# Patient Record
(demographics unavailable — no encounter records)

---

## 2024-11-11 NOTE — PROCEDURE
[FreeTextEntry3] : Date of Service: 11/07/2024   Account: [id number]  Patient: LISANDRA MIRANDA   YOB: 1970  Age: 54 year  Surgeon:      Matthieu Cordoba D.O.  Assistant:    None  Pre-Operative Diagnosis:         Cervical Radiculopathy (M54.12)  Post Operative Diagnosis:       Cervical Radiculopathy (M54.12)  Procedure:             Cervical (C7-T1) interlaminar epidural steroid injection under fluoroscopic guidance  Anesthesia:  MAC  This procedure was carried out using fluoroscopic guidance.  The risks and benefits of the procedure were discussed extensively with the patient.  The consent of the patient was obtained and the following procedure was performed. The patient was placed in the prone position on the fluoroscopy table and the area was prepped and draped in a sterile fashion.  A timeout was performed with all essential staff present and the site and side were verified.  The patient was placed in the prone position and optimized to patient comfort.  The cervical area was prepped and draped in a sterile fashion.  The fluoroscope visualized the C7-T1 interspace using slight cephalad-caudad angulation and this area was marked.  Using sterile technique the superficial skin was anesthetized with 1% Lidocaine.  A 20 gauge 3.5 inch Tuohy needle was advanced under fluoroscopy until ligament was engaged.  Using a contralateral oblique view, a "loss of resistance" to air technique was utilized in order to gain access to the epidural space.  After negative aspiration for heme and CSF, 1 cc of Omnipaque contrast was administered and the appropriate cervical epidurogram was obtained in the MARGIE and A/P view as well as digital subtraction angiography.  A total injectate of 3 cc of preservative free normal saline and 10mg decadron was then injected into the epidural space while maintaining meaningful verbal contact with the patient.    The needle was subsequently removed.  Vital signs remained normal.  Pulse oximeter was used throughout the procedure and the patient's pulse and oxygen saturation remained within normal limits.  The patient tolerated the procedure well.  There were no complications.  The patient was instructed to apply ice over the injection sites for twenty minutes every two hours for the next 24 to 48 hours.  Disposition:       1. The patient was advised to F/U in 1-2 weeks to assess the response to the injection.      2. The patient was also instructed to contact me immediately if there were any concerns related to the procedure performed.

## 2024-11-19 NOTE — DISCUSSION/SUMMARY
[de-identified] : A discussion regarding available pain management treatment options occurred with the patient. These included interventional, rehabilitative, pharmacological, and alternative modalities. We will proceed with the following:   Interventional treatment options: - Patient underwent two successful Bilateral L3, L4, L5 medial branch blocks with over 75% sustained relief.  1. Proceed with a Right and Left L3, L4, L5 medial branch radiofrequency ablation with sedation.  Patient presents with axial lumbar pain that has not responded to 3 months of conservative therapy including physical therapy or NSAID therapy.  The pain is interfering with activities of daily living and functionality.  The pain is exacerbated by facet loading.  Positive Kemps maneuver which is defined by pain reproduction with extension and rotation of the lumbar spine to the affected side.  The patient has had greater than 80% to two lumbar medial branch blocks. There is no unexplained neurologic deficit.  There is no history of systemic infection, unstable medical condition, bleeding tendency, or local infection.  This intervention is being performed to treat the patient's pain coming from the lumbar facet joints.   The patient has severe anxiety of procedures that necessitates monitored anesthesia care (MAC). The procedure performed will be close to major nerves, arteries, and spinal cord and/or joint structures. Due to the proximity of these structures, we need the patient to be still during the procedure.  With the help of MAC, this will be safely achieved and decrease the risk of any complications.   - Follow up 2 weeks post injection.   I Kendy Valdez attest that this documentation has been prepared under the direction and in the presence of provider Dr. Matthieu Cordoba.  The documentation recorded by the scribe in my presence, accurately reflects the service I personally performed, and the decisions made by me with my edits as appropriate.   Matthieu Cordoba, DO

## 2024-11-19 NOTE — PHYSICAL EXAM
[de-identified] : L spine   No rashes, erythema, edema noted TTP b/l lumbar paraspinal muscles Positive facet loading bilaterally Positive KEMPS test bilaterally LLE: 5/5 strength RLE: 5/5 strength Sensation intact b/l LE    C spine  No rashes, erythema, edema noted TTP b/l cervical paraspinal and trapezius muscles Positive spurlings bilaterally RUE: 5/5 strength LUE: 5/5 strength

## 2024-11-19 NOTE — DISCUSSION/SUMMARY
[de-identified] : A discussion regarding available pain management treatment options occurred with the patient. These included interventional, rehabilitative, pharmacological, and alternative modalities. We will proceed with the following:   Interventional treatment options: - Patient underwent two successful Bilateral L3, L4, L5 medial branch blocks with over 75% sustained relief.  1. Proceed with a Right and Left L3, L4, L5 medial branch radiofrequency ablation with sedation.  Patient presents with axial lumbar pain that has not responded to 3 months of conservative therapy including physical therapy or NSAID therapy.  The pain is interfering with activities of daily living and functionality.  The pain is exacerbated by facet loading.  Positive Kemps maneuver which is defined by pain reproduction with extension and rotation of the lumbar spine to the affected side.  The patient has had greater than 80% to two lumbar medial branch blocks. There is no unexplained neurologic deficit.  There is no history of systemic infection, unstable medical condition, bleeding tendency, or local infection.  This intervention is being performed to treat the patient's pain coming from the lumbar facet joints.   The patient has severe anxiety of procedures that necessitates monitored anesthesia care (MAC). The procedure performed will be close to major nerves, arteries, and spinal cord and/or joint structures. Due to the proximity of these structures, we need the patient to be still during the procedure.  With the help of MAC, this will be safely achieved and decrease the risk of any complications.   - Follow up 2 weeks post injection.   I Kendy Valdez attest that this documentation has been prepared under the direction and in the presence of provider Dr. Matthieu Cordoba.  The documentation recorded by the scribe in my presence, accurately reflects the service I personally performed, and the decisions made by me with my edits as appropriate.   Matthieu Cordoba, DO

## 2024-11-19 NOTE — HISTORY OF PRESENT ILLNESS
[FreeTextEntry1] : ORIGINAL PRESENTATION: We continue to follow up with the patient for his cervical pain associated with radiculopathy symptoms into the left upper extremity status post a work-related injury sustained in 2006. He has a mild to moderate disability. Ongoing chronic low back pain also noted with referred features into the bilateral lower extremities along the anterior aspect.  TODAY: He is called for follow up via phone. He is status post a bilateral SI joint injection on 10/26/23 which is providing about 60% relief of his SI joint pain. Overall, functionality has improved. He is able to sit, stand, and ambulate for longer periods of time. Preforming his ADLs are more comfortable. He does continue to have some residual buttock pain bilaterally; He wishes to proceed with a second bilateral SI joint injection to build on his relief. Relief from RFAs have been short-lived.   Patient has been under the care of Dr. Benitez for his cervical spine.  TODAY, 3-: Revisit encounter.   Since his last visit, he underwent a left hip injection on 3-7-2024. Day of the injection he afforded 80-90% pain relief. A couple of days later his pain started to return. His pain has not as severe as prior to the injection. He is also noting pain across the lower lumbar spine around the waistband bilaterally. He has pain with extension-based movements. He does note pain which is being referred into the groin bilaterally. He has pain with sitting for prolonged periods of time. When he sits, he cannot move his left without physically lifting it himself. He has pain when initially transitioning from a seated to standing position. He has pain when starting to walk however it then eases up. Pain limits his ADLs.  He is also complaining of left knee pain. He notes pain around the knee and refers into the anterior thigh and shin. Pain is made worse with sitting for prolonged periods of time or with any weight bearing.   TODAY, 5-: Revisit encounter.   Since his last visit, he underwent a bilateral L3, L4, L5 medial branch facet block on 5-2-2024. He afforded about 85% sustained relief for about 1-2 days following this procedure. His pain then slowly started to return. Today, he continues with about 50% sustained relief. He has stiffness in the back which is made worse first thing in the morning when getting up out of bed. He has pain with transitional movements or when rotating the back. Pain is present daily. He has been managing his pain with Meloxicam 15 mg daily as needed. He takes this about 3-4 days per week.   Over the last couple of weeks, he has been complaining of mid thoracic pain. He has been noticing sharp, stabbing pains in the area along with pain which wraps around into the thoracic paraspinal area. He does note shooting pains which can stop him in his tracks at time. Pain is made worse with flexion and extension-based movements. Pain has been present daily.   TODAY, 7-9-2024: Revisit encounter.   Since his last visit, he underwent a Bilateral L3, L4, L5 medial branch block on 6-. He affords about 75% sustained relief from this procedure. He continues with some ongoing discomfort however the lower back pain is almost fully under control. He does continue with some stiffness after performing any aggressive physical activity. At the present, his pain is tolerable. He does manage his pain with Meloxicam 15 mg daily prn.   With regards to his thoracic pain, he continues today with ongoing pain. He has been noticing sharp, stabbing pains in the area along with pain which wraps around into the thoracic paraspinal area. He does note shooting pains which can stop him in his tracks at time. Pain is made worse with flexion and extension-based movements. Pain has been present daily.   11-: Revisit encounter.   Since his last office visit, he underwent a C7-T1 cervical epidural steroid injection on 11-7-2024. He afforded about 100% pain relief from this procedure.   He is presenting today with a flare up of lower back pain. His pain continues to be axial in nature. He denies any radicular component. Pain is associated with stiffness and spams. Pain is made worse with standing or walking for prolonged periods of time. He has to hunch over to relieve the pain. The pain is present daily and limits his ADLS. He denies any radicular pain.

## 2024-11-19 NOTE — PHYSICAL EXAM
[de-identified] : L spine   No rashes, erythema, edema noted TTP b/l lumbar paraspinal muscles Positive facet loading bilaterally Positive KEMPS test bilaterally LLE: 5/5 strength RLE: 5/5 strength Sensation intact b/l LE    C spine  No rashes, erythema, edema noted TTP b/l cervical paraspinal and trapezius muscles Positive spurlings bilaterally RUE: 5/5 strength LUE: 5/5 strength

## 2024-11-19 NOTE — DATA REVIEWED
[FreeTextEntry1] : MRI of the thoracic spine taken on 6- showed moderate thoracic dextroscoliosis. Multilevel degenerative disc change/disc protrusions. Multilevel degenerative facet disease. Left posterior T6-7 disc protrusion with questions of minimal left anterior cord invagination. No significant thoracic canal and foraminal stenosis. Suspect bilateral posterior thyroid nodules.

## 2024-12-09 NOTE — IMAGING
[de-identified] : Physical exam of the left shoulder: No effusion, no erythema or ecchymosis.  Active range of motion forward flexion 130 degrees, active range of motion abduction 100 degrees, active motion from rotation L4.  He has some tenderness to palpation to the anterior aspect of the shoulder.  Intact to light touch.

## 2024-12-09 NOTE — PROCEDURE
[Large Joint Injection] : Large joint injection [Left] : of the left [Glenohumeral Joint] : glenohumeral joint [Pain] : pain [Alcohol] : alcohol [___ cc    1%] : Lidocaine ~Vcc of 1%  [___ cc    4mg] : Dexamethasone (Decadron) ~Vcc of 4 mg  [Risks, benefits, alternatives discussed / Verbal consent obtained] : the risks benefits, and alternatives have been discussed, and verbal consent was obtained [All ultrasound images have been permanently captured and stored accordingly in our picture archiving and communication system] : All ultrasound images have been permanently captured and stored accordingly in our picture archiving and communication system

## 2024-12-09 NOTE — HISTORY OF PRESENT ILLNESS
[de-identified] : The patient is a 54-year-old male here for a subsequent reevaluation of his left shoulder.  He does have glenohumeral joint arthritis.  The patient has pain in the left shoulder on a daily basis.  He had good relief from the injection administered at his previous office visit here.  He is doing home therapy exercises and takes meloxicam as needed.  He works as a  and is working full duty.

## 2024-12-09 NOTE — DISCUSSION/SUMMARY
[de-identified] : Today I recommend a cortisone injection for the left shoulder the patient agreed.  Left shoulder was injected with 2 cc lidocaine, 1 cc dexamethasone.  Procedure note generated.  He will continue home therapy exercises for the shoulder.  He is working full duty, mild degree of disability.  I will see him back in 2 months for further evaluation.

## 2024-12-26 NOTE — PROCEDURE
[FreeTextEntry3] : Date of Service: 12/23/2024   Account: [id number]  Patient: LISANDRA MIRANDA   YOB: 1970  Age: 54 year  Surgeon:                  Matthieu Cordoba DO  Assistant:                None  Pre-Operative Diagnosis:   Spondylosis of Lumbar Region without Myelopathy or Radiculopathy (M47.816)      Post Operative Diagnosis: Spondylosis of Lumbar Region without Myelopathy or Radiculopathy (M47.816)      Procedure:             Left L4-5, L5-S1 facet joint (L3, L4, L5 medial branch nerve) radiofrequency ablation with fluoroscopic guidance.  Anesthesia:            MAC  This procedure was carried out using fluoroscopic guidance.  The risks and benefits of the procedure were discussed extensively with the patient.  The consent of the patient was obtained and the following procedure was performed. The patient was placed in the prone position on the fluoroscopy table and the area was prepped and draped in a sterile fashion.  A timeout was performed with all essential staff present and the site and side were verified.  Under A/P visualization, the right sacral ala was identified and marked.  Using a 25 gauge needle the skin and subcutaneous structures at this point were localized with approximately 3 mL of 1% Lidocaine.  After this, an 18-gauge 10cm radiofrequency needle with a 10mm curved active tip was inserted under fluoroscopic visualization until the needle made contact with the sacral ala.   The fluoroscope was then redirected under A/P view to visualize the right L3-L4 and L4-L5 facet joint levels. The camera was obliqued to approximately 30 degrees to reveal good Kalen dog anatomical view. The junction of the superior articulate process and transverse process at the targeted levels were then identified and marked. Skin and subcutaneous structures were then anesthetized with approximately 3 mL of 1% Lidocaine at each of these levels.  After this, an 18-gauge 10cm radiofrequency needle with a 10mm curved active tip then advanced until the junction at the SAP and transverse process was met at each level.  Both ipsilateral oblique and lateral fluoroscopic views were obtained in order to advance the needle to the intended targets which was at the junction of the SAP and transverse process.   At all the treated levels, sensory stimulation was performed at 50 Hz not exceeding 1.0 volt and motor stimulation testing at 2 Hz not exceeding 3.0 volts.  There was no abnormal sensory or motor stimulation observed or reported by the patient most notably in the lower extremities.  Each treated level was then anesthetized with approximately 1 ml of 0.25% Marcaine. After which each area was then ablated at 80 degrees centigrade for 90 seconds each. Patient felt no pain reproduction during the ablation procedure.  The needles were subsequently removed.  Vital signs remained normal.  Pulse oximeter was used throughout the procedure and the patient's pulse and oxygen saturation remained within normal limits.  The patient tolerated the procedure well.  There were no complications.  The patient was instructed to apply ice over the injection sites for twenty minutes every two hours for the next 24 to 48 hours.  Disposition:       1. The patient was advised to F/U in 4 weeks to assess the response to the procedure.       2. The patient was also instructed to contact me immediately if there were any concerns related to the procedure performed.

## 2025-01-02 NOTE — PROCEDURE
[FreeTextEntry3] : Date of Service: 12/30/2025   Account: [id number]  Patient: LISANDRA MIRANDA   YOB: 1970  Age: 54 year  Surgeon:                  Matthieu Cordoba DO  Assistant:                None  Pre-Operative Diagnosis:   Spondylosis of Lumbar Region without Myelopathy or Radiculopathy (M47.816)      Post Operative Diagnosis: Spondylosis of Lumbar Region without Myelopathy or Radiculopathy (M47.816)      Procedure:             Right L4-5, L5-S1 facet joint (L3, L4, L5 medial branch nerve) radiofrequency ablation with fluoroscopic guidance.  Anesthesia:            MAC   This procedure was carried out using fluoroscopic guidance.  The risks and benefits of the procedure were discussed extensively with the patient.  The consent of the patient was obtained and the following procedure was performed. The patient was placed in the prone position on the fluoroscopy table and the area was prepped and draped in a sterile fashion.  A timeout was performed with all essential staff present and the site and side were verified.  Under A/P visualization, the right sacral ala was identified and marked.  Using a 25 gauge needle the skin and subcutaneous structures at this point were localized with approximately 3 mL of 1% Lidocaine.  After this, an 18-gauge 10cm radiofrequency needle with a 10mm curved active tip was inserted under fluoroscopic visualization until the needle made contact with the sacral ala.   The fluoroscope was then redirected under A/P view to visualize the right L3-L4 and L4-L5 facet joint levels. The camera was obliqued to approximately 30 degrees to reveal good Kalen dog anatomical view. The junction of the superior articulate process and transverse process at the targeted levels were then identified and marked. Skin and subcutaneous structures were then anesthetized with approximately 3 mL of 1% Lidocaine at each of these levels.  After this, an 18-gauge 10cm radiofrequency needle with a 10mm curved active tip then advanced until the junction at the SAP and transverse process was met at each level.  Both ipsilateral oblique and lateral fluoroscopic views were obtained in order to advance the needle to the intended targets which was at the junction of the SAP and transverse process.   At all the treated levels, sensory stimulation was performed at 50 Hz not exceeding 1.0 volt and motor stimulation testing at 2 Hz not exceeding 3.0 volts.  There was no abnormal sensory or motor stimulation observed or reported by the patient most notably in the lower extremities.  Each treated level was then anesthetized with approximately 1 ml of 0.25% Marcaine. After which each area was then ablated at 80 degrees centigrade for 90 seconds each. Patient felt no pain reproduction during the ablation procedure.  The needles were subsequently removed.  Vital signs remained normal.  Pulse oximeter was used throughout the procedure and the patient's pulse and oxygen saturation remained within normal limits.  The patient tolerated the procedure well.  There were no complications.  The patient was instructed to apply ice over the injection sites for twenty minutes every two hours for the next 24 to 48 hours.  Disposition:       1. The patient was advised to F/U in 4 weeks to assess the response to the procedure.       2. The patient was also instructed to contact me immediately if there were any concerns related to the procedure performed.

## 2025-01-29 NOTE — DISCUSSION/SUMMARY
[de-identified] : A discussion regarding available pain management treatment options occurred with the patient. These included interventional, rehabilitative, pharmacological, and alternative modalities. We will proceed with the following:   Interventional treatment options: - None indicated at this time.   Imagin. Ordered X-rays of the bilateral hips/pelvis due to pain and decrease in range of motion in that area to delineate a pain generator.   Complementary treatment options: - lifestyle modifications discussed - avoid bending and bend with knees - avoid heavy lifting   - Follow up in 2-3 months.   Disability status: Mild partial disability. Patient is working full time.   The patient had cervical ESIs and has done well. Pain is controlled.  He has had bilateral L3-5 mb RFA and is about 80% improved.   Total clinician time spent today on the patient is 30 minutes including preparing to see the patient, obtaining and/or reviewing and confirming history, performing medically necessary and appropriate examination, counseling and educating the patient and/or family, documenting clinical information in the EHR and communicating and/or referring to other healthcare professionals.   I Kendy Valdez attest that this documentation has been prepared under the direction and in the presence of provider Dr. Matthieu Cordoba.  The documentation recorded by the scribe in my presence, accurately reflects the service I personally performed, and the decisions made by me with my edits as appropriate.   Matthieu Cordoba, DO

## 2025-01-29 NOTE — PHYSICAL EXAM
[de-identified] : L spine   No rashes, erythema, edema noted TTP b/l lumbar paraspinal muscles Facet loading negative LLE: 5/5 strength RLE: 5/5 strength Sensation intact b/l LE    C spine  No rashes, erythema, edema noted TTP b/l cervical paraspinal and trapezius muscles spurlings negative bilaterally  RUE: 5/5 strength LUE: 5/5 strength

## 2025-01-29 NOTE — HISTORY OF PRESENT ILLNESS
[FreeTextEntry1] : ORIGINAL PRESENTATION: We continue to follow up with the patient for his cervical pain associated with radiculopathy symptoms into the left upper extremity status post a work-related injury sustained in 2006. He has a mild to moderate disability. Ongoing chronic low back pain also noted with referred features into the bilateral lower extremities along the anterior aspect.  TODAY: He is called for follow up via phone. He is status post a bilateral SI joint injection on 10/26/23 which is providing about 60% relief of his SI joint pain. Overall, functionality has improved. He is able to sit, stand, and ambulate for longer periods of time. Preforming his ADLs are more comfortable. He does continue to have some residual buttock pain bilaterally; He wishes to proceed with a second bilateral SI joint injection to build on his relief. Relief from RFAs have been short-lived.   Patient has been under the care of Dr. Benitez for his cervical spine.  TODAY, 3-: Revisit encounter.   Since his last visit, he underwent a left hip injection on 3-7-2024. Day of the injection he afforded 80-90% pain relief. A couple of days later his pain started to return. His pain has not as severe as prior to the injection. He is also noting pain across the lower lumbar spine around the waistband bilaterally. He has pain with extension-based movements. He does note pain which is being referred into the groin bilaterally. He has pain with sitting for prolonged periods of time. When he sits, he cannot move his left without physically lifting it himself. He has pain when initially transitioning from a seated to standing position. He has pain when starting to walk however it then eases up. Pain limits his ADLs.  He is also complaining of left knee pain. He notes pain around the knee and refers into the anterior thigh and shin. Pain is made worse with sitting for prolonged periods of time or with any weight bearing.   TODAY, 5-: Revisit encounter.   Since his last visit, he underwent a bilateral L3, L4, L5 medial branch facet block on 5-2-2024. He afforded about 85% sustained relief for about 1-2 days following this procedure. His pain then slowly started to return. Today, he continues with about 50% sustained relief. He has stiffness in the back which is made worse first thing in the morning when getting up out of bed. He has pain with transitional movements or when rotating the back. Pain is present daily. He has been managing his pain with Meloxicam 15 mg daily as needed. He takes this about 3-4 days per week.   Over the last couple of weeks, he has been complaining of mid thoracic pain. He has been noticing sharp, stabbing pains in the area along with pain which wraps around into the thoracic paraspinal area. He does note shooting pains which can stop him in his tracks at time. Pain is made worse with flexion and extension-based movements. Pain has been present daily.   TODAY, 7-9-2024: Revisit encounter.   Since his last visit, he underwent a Bilateral L3, L4, L5 medial branch block on 6-. He affords about 75% sustained relief from this procedure. He continues with some ongoing discomfort however the lower back pain is almost fully under control. He does continue with some stiffness after performing any aggressive physical activity. At the present, his pain is tolerable. He does manage his pain with Meloxicam 15 mg daily prn.   With regards to his thoracic pain, he continues today with ongoing pain. He has been noticing sharp, stabbing pains in the area along with pain which wraps around into the thoracic paraspinal area. He does note shooting pains which can stop him in his tracks at time. Pain is made worse with flexion and extension-based movements. Pain has been present daily.   11-: Revisit encounter.   Since his last office visit, he underwent a C7-T1 cervical epidural steroid injection on 11-7-2024. He afforded about 100% pain relief from this procedure.   He is presenting today with a flare up of lower back pain. His pain continues to be axial in nature. He denies any radicular component. Pain is associated with stiffness and spams. Pain is made worse with standing or walking for prolonged periods of time. He has to hunch over to relieve the pain. The pain is present daily and limits his ADLS. He denies any radicular pain.   1-: Revisit encounter.   With regards to his neck pain, he continues to heal well from surgical correction. He has little to no pain.   With regards to his lower back pain, he is status post Right L3, L4, L5 medial branch radiofrequency ablation on 12- and a Left L3, L4, L5 medial branch radiofrequency ablation on 12-. He affords about 70-80% sustained relief from this procedure. His pain is now tolerable and only made worse with prolonged physical activity. He is pleased with the results.

## 2025-01-29 NOTE — DISCUSSION/SUMMARY
[de-identified] : A discussion regarding available pain management treatment options occurred with the patient. These included interventional, rehabilitative, pharmacological, and alternative modalities. We will proceed with the following:   Interventional treatment options: - None indicated at this time.   Imagin. Ordered X-rays of the bilateral hips/pelvis due to pain and decrease in range of motion in that area to delineate a pain generator.   Complementary treatment options: - lifestyle modifications discussed - avoid bending and bend with knees - avoid heavy lifting   - Follow up in 2-3 months.   Disability status: Mild partial disability. Patient is working full time.   The patient had cervical ESIs and has done well. Pain is controlled.  He has had bilateral L3-5 mb RFA and is about 80% improved.   Total clinician time spent today on the patient is 30 minutes including preparing to see the patient, obtaining and/or reviewing and confirming history, performing medically necessary and appropriate examination, counseling and educating the patient and/or family, documenting clinical information in the EHR and communicating and/or referring to other healthcare professionals.   I Kendy Valdez attest that this documentation has been prepared under the direction and in the presence of provider Dr. Matthieu Cordoba.  The documentation recorded by the scribe in my presence, accurately reflects the service I personally performed, and the decisions made by me with my edits as appropriate.   Matthieu Cordoba, DO

## 2025-01-29 NOTE — PHYSICAL EXAM
[de-identified] : L spine   No rashes, erythema, edema noted TTP b/l lumbar paraspinal muscles Facet loading negative LLE: 5/5 strength RLE: 5/5 strength Sensation intact b/l LE    C spine  No rashes, erythema, edema noted TTP b/l cervical paraspinal and trapezius muscles spurlings negative bilaterally  RUE: 5/5 strength LUE: 5/5 strength

## 2025-02-03 NOTE — HISTORY OF PRESENT ILLNESS
[de-identified] : The patient is a 54-year-old male here for a subsequent reevaluation of his left shoulder.  He does have glenohumeral joint arthritis.  He states his shoulder feels about the same.  He had a cortisone injection at his last office visit which provided him with some relief.  He is currently working full duty.

## 2025-02-03 NOTE — IMAGING
[de-identified] : Physical exam of the left shoulder: No effusion, no erythema or ecchymosis.  Active range of motion forward flexion 150 degrees, passive range of motion forward flexion 170 degrees.  Active range of motion abduction 100 degrees, active motion from rotation L4.  He has some tenderness to palpation  over the glenohumeral joint.  Some weakness with rotator cuff resistance testing.

## 2025-02-03 NOTE — DISCUSSION/SUMMARY
[de-identified] : Today I recommend a cortisone injection for the left shoulder the patient agreed.  Left shoulder was injected with 2 cc lidocaine, 1 cc dexamethasone.  Procedure note generated.  He will continue with home therapy exercises for the left shoulder.  I will see him in 2 months for further evaluation.  He is working full duty, mild degree of disability.

## 2025-02-03 NOTE — PROCEDURE
[Large Joint Injection] : Large joint injection [Left] : of the left [Glenohumeral Joint] : glenohumeral joint [Pain] : pain [Alcohol] : alcohol [___ cc    1%] : Lidocaine ~Vcc of 1%  [___ cc    4mg] : Dexamethasone (Decadron) ~Vcc of 4 mg  [All ultrasound images have been permanently captured and stored accordingly in our picture archiving and communication system] : All ultrasound images have been permanently captured and stored accordingly in our picture archiving and communication system

## 2025-04-07 NOTE — PROCEDURE
[Large Joint Injection] : Large joint injection [Left] : of the left [Glenohumeral Joint] : glenohumeral joint [Pain] : pain [Alcohol] : alcohol [___ cc    1%] : Lidocaine ~Vcc of 1%  [___ cc    4mg] : Dexamethasone (Decadron) ~Vcc of 4 mg  [Risks, benefits, alternatives discussed / Verbal consent obtained] : the risks benefits, and alternatives have been discussed, and verbal consent was obtained

## 2025-04-07 NOTE — HISTORY OF PRESENT ILLNESS
[de-identified] : The patient is a 55-year-old male here for a subsequent reevaluation of his left shoulder.  He does have glenohumeral joint arthritis.  He again states his shoulder feels about the same.  He had a cortisone injection at his last office visit which provided he states helped a bit.  He is working full duty as a .

## 2025-04-07 NOTE — IMAGING
[de-identified] : Physical exam of the left shoulder: No effusion, no erythema or ecchymosis.  Active range of motion forward flexion 165 degrees, passive range of motion forward flexion 180 degrees.  Active range of motion abduction 100 degrees, active motion from rotation L4.  He has some tenderness to palpation over the glenohumeral joint.  Some weakness with rotator cuff resistance testing.

## 2025-04-07 NOTE — DISCUSSION/SUMMARY
[de-identified] : Today I recommend a cortisone injection for the left shoulder the patient agreed.  Left shoulder was injected with 2 cc lidocaine, 1 cc dexamethasone.  Procedure note generated.  He will continue home therapy exercises.  He was advised to take meloxicam as needed not daily.  New Rx sent to the pharmacy.  I will see him back in 2 months for further evaluation, he is working full duty, mild degree of disability.

## 2025-04-24 NOTE — PHYSICAL EXAM
[de-identified] : L spine   No rashes, erythema, edema noted TTP b/l lumbar paraspinal muscles Facet loading negative LLE: 5/5 strength RLE: 5/5 strength Sensation intact b/l LE    C spine  No rashes, erythema, edema noted TTP b/l cervical paraspinal and trapezius muscles spurlings negative bilaterally  RUE: 5/5 strength LUE: 5/5 strength   L hip  No rashes, erythema, edema  TTP at anterior hip, gluteus, GTB Stability: no gross instability  ROM: Painful ROM  JOE + Gait: limping with weight bearing

## 2025-04-24 NOTE — DISCUSSION/SUMMARY
[de-identified] : A discussion regarding available pain management treatment options occurred with the patient. These included interventional, rehabilitative, pharmacological, and alternative modalities. We will proceed with the following:   Interventional treatment options: 1. Proceed with a Left hip injection under fluoroscopic guidancde.  The patient is having significant left hip pain with minimal relief with conservative treatments so we decided to proceed with an intra-articular hip injection. The risks and benefits were discussed which included bruising, hematoma, worse pain, intravascular injection, steroid reaction. All questions were answered and concerns addressed. The patient understands that this is likely a temporary solution to their pain. The patient may have up to three intra-articular joint injections a year and needs to consider surgical options for longer term relief of pain should they not improve. They will schedule at the earliest convenience.   Complementary treatment options: - lifestyle modifications discussed - avoid bending and bend with knees - avoid heavy lifting   - Follow up 2 weeks post injection.   Disability status: Mild partial disability. Patient is working full time.   I Kendy Valdez attest that this documentation has been prepared under the direction and in the presence of provider Dr. Matthieu Cordoba.  The documentation recorded by the scribe in my presence, accurately reflects the service I personally performed, and the decisions made by me with my edits as appropriate.   Matthieu Cordoba, DO

## 2025-04-24 NOTE — PHYSICAL EXAM
[de-identified] : L spine   No rashes, erythema, edema noted TTP b/l lumbar paraspinal muscles Facet loading negative LLE: 5/5 strength RLE: 5/5 strength Sensation intact b/l LE    C spine  No rashes, erythema, edema noted TTP b/l cervical paraspinal and trapezius muscles spurlings negative bilaterally  RUE: 5/5 strength LUE: 5/5 strength   L hip  No rashes, erythema, edema  TTP at anterior hip, gluteus, GTB Stability: no gross instability  ROM: Painful ROM  JOE + Gait: limping with weight bearing

## 2025-04-24 NOTE — HISTORY OF PRESENT ILLNESS
[FreeTextEntry1] : ORIGINAL PRESENTATION: We continue to follow up with the patient for his cervical pain associated with radiculopathy symptoms into the left upper extremity status post a work-related injury sustained in 2006. He has a mild to moderate disability. Ongoing chronic low back pain also noted with referred features into the bilateral lower extremities along the anterior aspect.  TODAY: He is called for follow up via phone. He is status post a bilateral SI joint injection on 10/26/23 which is providing about 60% relief of his SI joint pain. Overall, functionality has improved. He is able to sit, stand, and ambulate for longer periods of time. Preforming his ADLs are more comfortable. He does continue to have some residual buttock pain bilaterally; He wishes to proceed with a second bilateral SI joint injection to build on his relief. Relief from RFAs have been short-lived.   Patient has been under the care of Dr. Benitez for his cervical spine.  TODAY, 3-: Revisit encounter.   Since his last visit, he underwent a left hip injection on 3-7-2024. Day of the injection he afforded 80-90% pain relief. A couple of days later his pain started to return. His pain has not as severe as prior to the injection. He is also noting pain across the lower lumbar spine around the waistband bilaterally. He has pain with extension-based movements. He does note pain which is being referred into the groin bilaterally. He has pain with sitting for prolonged periods of time. When he sits, he cannot move his left without physically lifting it himself. He has pain when initially transitioning from a seated to standing position. He has pain when starting to walk however it then eases up. Pain limits his ADLs.  He is also complaining of left knee pain. He notes pain around the knee and refers into the anterior thigh and shin. Pain is made worse with sitting for prolonged periods of time or with any weight bearing.   TODAY, 5-: Revisit encounter.   Since his last visit, he underwent a bilateral L3, L4, L5 medial branch facet block on 5-2-2024. He afforded about 85% sustained relief for about 1-2 days following this procedure. His pain then slowly started to return. Today, he continues with about 50% sustained relief. He has stiffness in the back which is made worse first thing in the morning when getting up out of bed. He has pain with transitional movements or when rotating the back. Pain is present daily. He has been managing his pain with Meloxicam 15 mg daily as needed. He takes this about 3-4 days per week.   Over the last couple of weeks, he has been complaining of mid thoracic pain. He has been noticing sharp, stabbing pains in the area along with pain which wraps around into the thoracic paraspinal area. He does note shooting pains which can stop him in his tracks at time. Pain is made worse with flexion and extension-based movements. Pain has been present daily.   TODAY, 7-9-2024: Revisit encounter.   Since his last visit, he underwent a Bilateral L3, L4, L5 medial branch block on 6-. He affords about 75% sustained relief from this procedure. He continues with some ongoing discomfort however the lower back pain is almost fully under control. He does continue with some stiffness after performing any aggressive physical activity. At the present, his pain is tolerable. He does manage his pain with Meloxicam 15 mg daily prn.   With regards to his thoracic pain, he continues today with ongoing pain. He has been noticing sharp, stabbing pains in the area along with pain which wraps around into the thoracic paraspinal area. He does note shooting pains which can stop him in his tracks at time. Pain is made worse with flexion and extension-based movements. Pain has been present daily.   11-: Revisit encounter.   Since his last office visit, he underwent a C7-T1 cervical epidural steroid injection on 11-7-2024. He afforded about 100% pain relief from this procedure.   He is presenting today with a flare up of lower back pain. His pain continues to be axial in nature. He denies any radicular component. Pain is associated with stiffness and spams. Pain is made worse with standing or walking for prolonged periods of time. He has to hunch over to relieve the pain. The pain is present daily and limits his ADLS. He denies any radicular pain.   1-: Revisit encounter.   With regards to his neck pain, he continues to heal well from surgical correction. He has little to no pain.   With regards to his lower back pain, he is status post Right L3, L4, L5 medial branch radiofrequency ablation on 12- and a Left L3, L4, L5 medial branch radiofrequency ablation on 12-. He affords about 70-80% sustained relief from this procedure. His pain is now tolerable and only made worse with prolonged physical activity. He is pleased with the results.   4-: Revisit encounter.   He is presenting today with pain mainly in the hip region. The pain has been referring into the groin and occasionally into the anterior aspect of the thigh on the left. His pain is made worse with weight bearing activities especially after sitting for prolonged periods of time. His pain is present daily and limits his ADLs. He rates the pain at a 8/10 on the pain scale. The pain is present daily.   With regards to his neck pain, he continues to heal well from surgical correction. He has little to no pain.

## 2025-04-24 NOTE — DISCUSSION/SUMMARY
[de-identified] : A discussion regarding available pain management treatment options occurred with the patient. These included interventional, rehabilitative, pharmacological, and alternative modalities. We will proceed with the following:   Interventional treatment options: 1. Proceed with a Left hip injection under fluoroscopic guidancde.  The patient is having significant left hip pain with minimal relief with conservative treatments so we decided to proceed with an intra-articular hip injection. The risks and benefits were discussed which included bruising, hematoma, worse pain, intravascular injection, steroid reaction. All questions were answered and concerns addressed. The patient understands that this is likely a temporary solution to their pain. The patient may have up to three intra-articular joint injections a year and needs to consider surgical options for longer term relief of pain should they not improve. They will schedule at the earliest convenience.   Complementary treatment options: - lifestyle modifications discussed - avoid bending and bend with knees - avoid heavy lifting   - Follow up 2 weeks post injection.   Disability status: Mild partial disability. Patient is working full time.   I Kendy Valdez attest that this documentation has been prepared under the direction and in the presence of provider Dr. Matthieu Cordoba.  The documentation recorded by the scribe in my presence, accurately reflects the service I personally performed, and the decisions made by me with my edits as appropriate.   Matthieu Cordoba, DO

## 2025-06-04 NOTE — PROCEDURE
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    This procedure was carried out using fluoroscopic guidance. The risks and benefits of the procedure were discussed extensively with the patient. The consent of the patient was obtained and the following procedure was performed. The patient was placed in the supine position on the fluoroscopic table and the area was prepped and draped in a sterile fashion. A timeout was performed with all essential staff present and the site and side were verified.  The patient was placed in the supine position with left hip flexed and externally rotated 25 degrees. The area of the left groin was prepped and draped in a sterile fashion. The fluoroscopic image intensifier was then positioned so that the left hip appeared in view, and the midline intertrochanteric region was identified and marked. A 25 gauge spinal needle was then inserted and directed into this left hip intra-capsular region. After negative aspiration for heme and CSF, 3 cc of Omnipaque was injected and appeared to fill the joint margins.  Left hip arthrogram showed no intravascular flow, and good spread around the femoral head and to the acetabulum. An injectate of 4 cc 0.25% Marcaine plus 10mg decadron was then injected into the left hip space.   The needle was subsequently removed. Vital signs remained normal. Pulse oximeter was used throughout the procedure and the patient's pulse and oxygen saturation remained within normal limits. The patient tolerated the procedure well. There were no complications. The patient was instructed to apply ice over the injection sites for twenty minutes every two hours for the next 24 to 48 hours.  Disposition:   1. The patient was advised to F/U in 1-2 weeks to assess the response to the injection.  2. The patient was also instructed to contact me immediately if there were any concerns related to the procedure performed.

## 2025-06-09 NOTE — DISCUSSION/SUMMARY
[de-identified] : Today I recommend a cortisone injection for the left shoulder the patient agreed.  Left shoulder was injected with 2 cc lidocaine, 1 cc dexamethasone.  Procedure note generated.  He will continue the home therapy exercises 3 times a week.  He will continue taking meloxicam as needed.  I will see him in 2 months for further evaluation.  He is working full duty, mild degree of disability.
Statement Selected

## 2025-06-09 NOTE — IMAGING
[de-identified] : Physical exam of the left shoulder: No effusion, no erythema or ecchymosis.  Active range of motion forward flexion 150 degrees, passive range of motion forward flexion 170 degrees.  Active range of motion abduction 90 degrees, active motion from rotation L4.  He has some tenderness to palpation over the glenohumeral joint.  Still some weakness with rotator cuff resistance testing.

## 2025-06-09 NOTE — HISTORY OF PRESENT ILLNESS
[de-identified] : The patient is a 55-year-old male here for a subsequent reevaluation of his left shoulder.  He does have glenohumeral joint arthritis.  He again states his shoulder feels about the same.  Today he relates the pain is increased a bit.  He is doing home therapy exercises 3 times a week for the shoulder.  He takes about 30 minutes to complete the exercises.  He is taking meloxicam as needed.  He is working full duty at his job.

## 2025-06-18 NOTE — DISCUSSION/SUMMARY
[de-identified] : A discussion regarding available pain management treatment options occurred with the patient. These included interventional, rehabilitative, pharmacological, and alternative modalities. We will proceed with the following:   Interventional treatment options: 1. Proceed with a T12-L1 thoracic epidural steroid injection under fluoroscopic guidance.  Treatment options were discussed. The patient has been having persistent, severe mid back pain and thoracic radicular pain that has minimally improved with conservative therapy thus far (including physical therapy, home stretching and anti-inflammatory medications. The patient was given the option of proceeding with a thoracic epidural steroid injection to try and get the patient some pain relief. The risks and benefits were discussed, which included the associated problems with steroids, bleeding, nerve injury, lack of improvement with pain, and 0.5% chance of a post dural puncture headache.  Complementary treatment options: - lifestyle modifications discussed - avoid bending and bend with knees - avoid heavy lifting   - Follow up 2 weeks post injection.   Disability status: Mild partial disability. Patient is working full time.   I Kendy Valdez attest that this documentation has been prepared under the direction and in the presence of provider Dr. Matthieu Cordoba.  The documentation recorded by the scribe in my presence, accurately reflects the service I personally performed, and the decisions made by me with my edits as appropriate.   Matthieu Cordoba, DO

## 2025-06-18 NOTE — HISTORY OF PRESENT ILLNESS
[FreeTextEntry1] : ORIGINAL PRESENTATION: We continue to follow up with the patient for his cervical pain associated with radiculopathy symptoms into the left upper extremity status post a work-related injury sustained in 2006. He has a mild to moderate disability. Ongoing chronic low back pain also noted with referred features into the bilateral lower extremities along the anterior aspect.  TODAY: He is called for follow up via phone. He is status post a bilateral SI joint injection on 10/26/23 which is providing about 60% relief of his SI joint pain. Overall, functionality has improved. He is able to sit, stand, and ambulate for longer periods of time. Preforming his ADLs are more comfortable. He does continue to have some residual buttock pain bilaterally; He wishes to proceed with a second bilateral SI joint injection to build on his relief. Relief from RFAs have been short-lived.   Patient has been under the care of Dr. Benitez for his cervical spine.  TODAY, 3-: Revisit encounter.   Since his last visit, he underwent a left hip injection on 3-7-2024. Day of the injection he afforded 80-90% pain relief. A couple of days later his pain started to return. His pain has not as severe as prior to the injection. He is also noting pain across the lower lumbar spine around the waistband bilaterally. He has pain with extension-based movements. He does note pain which is being referred into the groin bilaterally. He has pain with sitting for prolonged periods of time. When he sits, he cannot move his left without physically lifting it himself. He has pain when initially transitioning from a seated to standing position. He has pain when starting to walk however it then eases up. Pain limits his ADLs.  He is also complaining of left knee pain. He notes pain around the knee and refers into the anterior thigh and shin. Pain is made worse with sitting for prolonged periods of time or with any weight bearing.   TODAY, 5-: Revisit encounter.   Since his last visit, he underwent a bilateral L3, L4, L5 medial branch facet block on 5-2-2024. He afforded about 85% sustained relief for about 1-2 days following this procedure. His pain then slowly started to return. Today, he continues with about 50% sustained relief. He has stiffness in the back which is made worse first thing in the morning when getting up out of bed. He has pain with transitional movements or when rotating the back. Pain is present daily. He has been managing his pain with Meloxicam 15 mg daily as needed. He takes this about 3-4 days per week.   Over the last couple of weeks, he has been complaining of mid thoracic pain. He has been noticing sharp, stabbing pains in the area along with pain which wraps around into the thoracic paraspinal area. He does note shooting pains which can stop him in his tracks at time. Pain is made worse with flexion and extension-based movements. Pain has been present daily.   TODAY, 7-9-2024: Revisit encounter.   Since his last visit, he underwent a Bilateral L3, L4, L5 medial branch block on 6-. He affords about 75% sustained relief from this procedure. He continues with some ongoing discomfort however the lower back pain is almost fully under control. He does continue with some stiffness after performing any aggressive physical activity. At the present, his pain is tolerable. He does manage his pain with Meloxicam 15 mg daily prn.   With regards to his thoracic pain, he continues today with ongoing pain. He has been noticing sharp, stabbing pains in the area along with pain which wraps around into the thoracic paraspinal area. He does note shooting pains which can stop him in his tracks at time. Pain is made worse with flexion and extension-based movements. Pain has been present daily.   11-: Revisit encounter.   Since his last office visit, he underwent a C7-T1 cervical epidural steroid injection on 11-7-2024. He afforded about 100% pain relief from this procedure.   He is presenting today with a flare up of lower back pain. His pain continues to be axial in nature. He denies any radicular component. Pain is associated with stiffness and spams. Pain is made worse with standing or walking for prolonged periods of time. He has to hunch over to relieve the pain. The pain is present daily and limits his ADLS. He denies any radicular pain.   1-: Revisit encounter.   With regards to his neck pain, he continues to heal well from surgical correction. He has little to no pain.   With regards to his lower back pain, he is status post Right L3, L4, L5 medial branch radiofrequency ablation on 12- and a Left L3, L4, L5 medial branch radiofrequency ablation on 12-. He affords about 70-80% sustained relief from this procedure. His pain is now tolerable and only made worse with prolonged physical activity. He is pleased with the results.   4-: Revisit encounter.   He is presenting today with pain mainly in the hip region. The pain has been referring into the groin and occasionally into the anterior aspect of the thigh on the left. His pain is made worse with weight bearing activities especially after sitting for prolonged periods of time. His pain is present daily and limits his ADLs. He rates the pain at a 8/10 on the pain scale. The pain is present daily.   With regards to his neck pain, he continues to heal well from surgical correction. He has little to no pain.   6-: Revisit encounter.   Since his last office visit, he underwent a Left hip injection on 6-2-2025. He affords about 90% sustained relief from this procedure. He is now experiencing tolerable pain in the hip. He denies any recent flare ups. He has only been experiencing some pain as the weather has been bad.   With regards to his neck pain, he continues to heal well from surgical correction. He has little to no pain.   Today, he is presenting today with ongoing pain in the thoracic region. He has been experiencing some pain referring into the right ribs. He describes the pain as sharp, shooting, stabbing. His pain is made worse with sitting for prolonged periods of time or when lying down at night. His pain is intermittent in nature and rated anywhere from a 4 to a 8/10 on the pain scale.

## 2025-07-19 NOTE — HISTORY OF PRESENT ILLNESS
[de-identified] : Patient here for evaluation bilateral knee pain. Denies instability Pain with ambulation and stairs  NAD bilateral knee No skin breakdown Tricompartmental TTP Positive patella grind PF crepitus Negative lachman Negative varus/valgus instability ROM 0-110 Pain with forced extension and flexion NVI Compartments soft and NT  Xray reviewed and significant for bilateral knee arthritis, lateral compt narrowing  Plan went over findings explained the imaging and tx options explained oa and possibility of tka in the future will send auth for charlene knee visco